# Patient Record
(demographics unavailable — no encounter records)

---

## 2024-11-07 NOTE — HISTORY OF PRESENT ILLNESS
[FreeTextEntry1] : This is a 60 y/o male with a pmhx of GERD, asthma, dilated aortic root, esophageal cancer, MS coming in today for follow up.  Pt is s/p RIH repair with mesh on 8/21/24 by Dr. Campo. No post-op issues noted. Patient to follow up with Dr. Longoria for moderate hiatal hernia and gallstones noted on CT scan. Patient states that fell 2 weeks ago and hurt his back. States that it is improving but still is having some pain. Pt states have been putting a lidocaine patch on and heating pad. Pt states that it is better when moving around and worst at night.   Patient denies chest pain, dyspnea, palpitations, dizziness, changes in bowel/bladder habits or appetite.

## 2024-11-07 NOTE — PHYSICAL EXAM

## 2025-01-04 NOTE — HISTORY OF PRESENT ILLNESS
[TextBox_4] : Had hernia repair. Had COVID x 2 Doing well off Breo. Now going yearly for f/u esophageal CA. Stable off treatment.  Denies significant cough wheezing or chest pain.  Baseline dyspnea on exertion. dealing with MS No issues with swallowing social smoking hx quit age [id number]

## 2025-01-04 NOTE — PHYSICAL EXAM
[Normal Oropharynx] : normal oropharynx [Normal Appearance] : normal appearance [No Neck Mass] : no neck mass [Normal S1, S2] : normal s1, s2 [Clear to Auscultation Bilaterally] : clear to auscultation bilaterally [Normal to Percussion] : normal to percussion [No Abnormalities] : no abnormalities [Benign] : benign [Not Tender] : not tender [No HSM] : no hsm [No Clubbing] : no clubbing [No Cyanosis] : no cyanosis [No Edema] : no edema [TextBox_2] : Overweight

## 2025-01-04 NOTE — DISCUSSION/SUMMARY
[FreeTextEntry1] : Asthma relatively stable without chronic bronchodilator therapy.  Uses PRN beta agonist. Pulmonary nodules Stable. Mild obstructive sleep apnea syndrome, minimal s/s. Has lost weight. MS without evidence of significant restrictive physiology. History of exposure at [email or web address].   Esophageal carcinoma. Preop for endoscopy.

## 2025-01-04 NOTE — HISTORY OF PRESENT ILLNESS
[TextBox_4] : Had hernia repair. Had COVID x 2 Doing well off Breo. Now going yearly for f/u esophageal CA. Stable off treatment.  Denies significant cough wheezing or chest pain.  Baseline dyspnea on exertion. dealing with MS No issues with swallowing social smoking hx quit age [id number]

## 2025-01-04 NOTE — ASSESSMENT
[FreeTextEntry1] : Continue present bronchodilator therapy. F/U CT as per cardiology.  Follow-up in 6 months or sooner on a PRN basis.   35 minutes spent in evaluation management and review of studies.

## 2025-01-04 NOTE — PROCEDURE
[FreeTextEntry1] : Ct chest reviewed November 13, 2024.  Compared to prior.  Reviewed and discussed. Stable small pulmonary nodules.  No significant adenopathy.  Atherosclerotic changes.   Discussed results of 2-night HHS and treatment options  01/03/2025 Pulmonary function testing Normal Flow RatesNormal Lung Volumes. Diffusion capacity is normal.  PFT attached relatively stable function.

## 2025-01-04 NOTE — DISCUSSION/SUMMARY
[FreeTextEntry1] : Asthma relatively stable without chronic bronchodilator therapy.  Uses PRN beta agonist. Pulmonary nodules Stable. Mild obstructive sleep apnea syndrome, minimal s/s. Has lost weight. MS without evidence of significant restrictive physiology. History of exposure at [employer].   Esophageal carcinoma. Preop for endoscopy.

## 2025-02-10 NOTE — HISTORY OF PRESENT ILLNESS
[FreeTextEntry1] : Initial hx 8/2022 Has been walking slowly for >10y.  Retired in 2016; used to be a ; had some gait dysfunction, but did not lead to seeing neurologist.  2016 - [place], had episode of his legs "just stopping", lasted hours and then back to baseline. Then went to Dr. Gómez who felt this was "just old age". Saw several other neurologists over the years. ~[id number] - episode of temporary more severe gait dysfunction during a hot day. went to [place]. Had MRIs. Saw Dr. Nissenbaum, had MRIs showing lesions in brain and cord. dx'd with MS. Then started with Dr. Haider. Started tysabri ~2018, gets it at [facility].  Pt believes he is JCV negative but unsure. No assistive devices. Gait has worsened over past few years, "not as fluid", balance is worse.  10/2022 - Broke his ankle while walking down steps. Stopped tysabri for 2 months.  In 2022, recommended ocrevus given progression. however, continued on tysabri and followed up with Dr. Haider.  Had a fall in 11/2022 and broke his LLE. needed hospitalization and rehab. Since then, never bounced back. 2023 - dx'd with esophageal cancer; was recommended for chemo and esophagectomy; [place] [facility] [place] to [place]. had clear margins.  Summer 2024 - had UTI and was hospitalized.  9/2024 - stopped tysabri because he became JCV positive. Started kesimpta 1/2025.    Subj interval:  Started using the cane in late 2022.   Some stuttering speech.    ROS/Current Sx: cold bothers him more than heat gait dysfunction BL hand pins/needles some falls but only when it's cold and outside  PMHX: MS prostate CA HTN HLD gout asthma esophageal cancer  MEDS: kesimpta allopurinol amlodipine asa81 flonase metoprolol losartan pravastatin proair pantoprazole   SHx: remote tob, no etoh currently, no drugs. used to be a . wife has melanoma  FHx: no MS. no neuro.   O:  AO3. Normally conversant but likely cognitively impaired, asking similar questions repeatedly. Follows commands, names, and repeats. Good attention.  PERRL, VFF, EOMI, no nystagmus, face symmetric, TUP at midline.  Motor:     R:  L: Del   5  5 Bi   5  5 Tri   5  5 Wrist Extensors  5  5 Finger abductors  5  5    5  5   HF   5  5 KE   5  5 KF   5  5 DF   5  5 PF   5  5  Tone   R  L UE   0  0  LE   1  1  Sensory  RUE  LUE  RLE LLE  LT   +  +  +  + Vib   +  +  mod  mod JPS   +  +  +  + PP   +  +  +  + Temp   +  +  +  +  Reflexes:    R  L   Biceps   3  3 BR   3  3 Pat   3  3 AJ   3  3  TOES   F  F  Coordination:    R  L   FTN   +/- +/- JASPAL   +/-  +/- HTS   1  1  Other       Gait: severe wide based gait. possible circumduction on the RLE, appears somewhat spastic. good heel and toe elevation. cannot tandem    Assistance: none   MRI brain, C, and T 2019 - consistent with MS.  MRI brain 12/2022 - largely stable, though a few small lesions are only seen in 2022 and not in 2019 but are small. this may be related to scanner differences.   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 Started tysabri ~2018 until late 2022. Switched to kesimpta in 1/2025.  ______________________ all questions answered, education provided, management discussed at length.   - cont kesimpta - referral to ophtho or neuro-ophtho for evaluation - counseled on using walker or at least 4-prong cane to prevent falls - start PT - lab work q6m while on kesimpta starting in 4/2025 - RTC 3-4m sooner prn

## 2025-02-10 NOTE — HISTORY OF PRESENT ILLNESS
[FreeTextEntry1] : Initial hx 8/2022 Has been walking slowly for >10y.  Retired in 2016; used to be a ; had some gait dysfunction, but did not lead to seeing neurologist.  2016 - [place], had episode of his legs "just stopping", lasted hours and then back to baseline. Then went to Dr. Gómez who felt this was "just old age". Saw several other neurologists over the years. ~[id number] - episode of temporary more severe gait dysfunction during a hot day. went to [place]. Had MRIs. Saw Dr. Nissenbaum, had MRIs showing lesions in brain and cord. dx'd with MS. Then started with Dr. Haider. Started tysabri ~2018, gets it at [facility].  Pt believes he is JCV negative but unsure. No assistive devices. Gait has worsened over past few years, "not as fluid", balance is worse.  10/2022 - Broke his ankle while walking down steps. Stopped tysabri for 2 months.  In 2022, recommended ocrevus given progression. however, continued on tysabri and followed up with Dr. Haider.  Had a fall in 11/2022 and broke his LLE. needed hospitalization and rehab. Since then, never bounced back. 2023 - dx'd with esophageal cancer; was recommended for chemo and esophagectomy; [place] [facility] [place] to [place]. had clear margins.  Summer 2024 - had UTI and was hospitalized.  9/2024 - stopped tysabri because he became JCV positive. Started kesimpta 1/2025.    Subj interval:  Started using the cane in late 2022.   Some stuttering speech.    ROS/Current Sx: cold bothers him more than heat gait dysfunction BL hand pins/needles some falls but only when it's cold and outside  PMHX: MS prostate CA HTN HLD gout asthma esophageal cancer  MEDS: kesimpta allopurinol amlodipine asa81 flonase metoprolol losartan pravastatin proair pantoprazole   SHx: remote tob, no etoh currently, no drugs. used to be a . wife has melanoma  FHx: no MS. no neuro.   O:  AO3. Normally conversant but likely cognitively impaired, asking similar questions repeatedly. Follows commands, names, and repeats. Good attention.  PERRL, VFF, EOMI, no nystagmus, face symmetric, TUP at midline.  Motor:     R:  L: Del   5  5 Bi   5  5 Tri   5  5 Wrist Extensors  5  5 Finger abductors  5  5    5  5   HF   5  5 KE   5  5 KF   5  5 DF   5  5 PF   5  5  Tone   R  L UE   0  0  LE   1  1  Sensory  RUE  LUE  RLE LLE  LT   +  +  +  + Vib   +  +  mod  mod JPS   +  +  +  + PP   +  +  +  + Temp   +  +  +  +  Reflexes:    R  L   Biceps   3  3 BR   3  3 Pat   3  3 AJ   3  3  TOES   F  F  Coordination:    R  L   FTN   +/- +/- JASPAL   +/-  +/- HTS   1  1  Other       Gait: severe wide based gait. possible circumduction on the RLE, appears somewhat spastic. good heel and toe elevation. cannot tandem    Assistance: none   MRI brain, C, and T 2019 - consistent with MS.  MRI brain 12/2022 - largely stable, though a few small lesions are only seen in 2022 and not in 2019 but are small. this may be related to scanner differences.   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 Started tysabri ~2018 until late 2022. Switched to kesimpta in 1/2025.  ______________________ all questions answered, education provided, management discussed at length.   - cont kesimpta - referral to ophtho or neuro-ophtho for evaluation - counseled on using walker or at least 4-prong cane to prevent falls - start PT - lab work q6m while on kesimpta starting in 4/2025 - RTC 3-4m sooner prn

## 2025-03-11 NOTE — HISTORY OF PRESENT ILLNESS
[FreeTextEntry1] : This is a 61 y/o male with a pmhx of GERD, asthma, dilated aortic root, esophageal cancer, MS coming in today for follow up. Patient feels well today, offers no complaints.  Denies chest pain, palpitations, diaphoresis, vision changes, HA, dizziness, syncope, cough, wheezing, SOB/MARTINEZ, edema, fever, chills, infection.

## 2025-06-19 NOTE — HISTORY OF PRESENT ILLNESS
[FreeTextEntry1] : Initial hx 8/2022 Has been walking slowly for >10y.  Retired in 2016; used to be a ; had some gait dysfunction, but did not lead to seeing neurologist.  2016 - [place], had episode of his legs "just stopping", lasted hours and then back to baseline. Then went to Dr. Gómez who felt this was "just old age". Saw several other neurologists over the years. ~[id number] - episode of temporary more severe gait dysfunction during a hot day. went to [place]. Had MRIs. Saw Dr. Nissenbaum, had MRIs showing lesions in brain and cord. dx'd with MS. Then started with Dr. Haider. Started tysabri ~2018, gets it at [facility].  Pt believes he is JCV negative but unsure. No assistive devices. Gait has worsened over past few years, "not as fluid", balance is worse.  10/2022 - Broke his ankle while walking down steps. Stopped tysabri for 2 months.  In 2022, recommended ocrevus given progression. however, continued on tysabri and followed up with Dr. Haider.  lost to f/u from 2/2023 to 2/2025. Had a fall in 11/2022 and broke his LLE. needed hospitalization and rehab. Since then, never bounced back. 2023 - dx'd with esophageal cancer; was recommended for chemo and esophagectomy; then [place] [facility] [place] minimally invasive procedure to remove tumor. had clear margins.  Summer 2024 - had UTI and was hospitalized.  9/2024 - stopped tysabri because he became JCV positive. Started kesimpta 1/2025.    Subj interval:  Started using the cane in late 2022. Switched to walker in mid 2025 because of frequent falls.   Some stuttering speech.    ROS/Current Sx: cold bothers him more than heat gait dysfunction BL hand pins/needles some falls but only when it's cold and outside  PMHX: MS prostate CA HTN HLD gout asthma esophageal cancer  MEDS: kesimpta allopurinol amlodipine asa81 flonase metoprolol losartan pravastatin proair pantoprazole   SHx: remote tob, no etoh currently, no drugs. used to be a . wife has melanoma  FHx: no MS. no neuro.   O:  AO3. Normally conversant but likely cognitively impaired, asking similar questions repeatedly. Follows commands, names, and repeats. Good attention.  PERRL, VFF, EOMI, no nystagmus, face symmetric, TUP at midline.  Motor:     R:  L: Del   5  5 Bi   5  5 Tri   5  5 Wrist Extensors  5  5 Finger abductors  5  5    5  5   HF   5  5 KE   5  5 KF   5  5 DF   5  5 PF   5  5  Tone   R  L UE   0  0  LE   1  1  Sensory  RUE  LUE  RLE LLE  LT   +  +  +  + Vib   +  +  mod  mod JPS   +  +  +  + PP   +  +  +  + Temp   +  +  +  +  Reflexes:    R  L   Biceps   3  3 BR   3  3 Pat   3  3 AJ   3  3  TOES   F  F  Coordination:    R  L   FTN   +/- +/- JASPAL   +/-  +/- HTS   1  1  Other       Gait: severe wide based gait. possible circumduction on the RLE, appears somewhat spastic. good heel and toe elevation. cannot tandem    Assistance: none   MRI brain, C, and T 2019 - consistent with MS.  MRI brain 12/2022 - largely stable, though a few small lesions are only seen in 2022 and not in 2019 but are small. this may be related to scanner differences.   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 Started tysabri ~2018 until late 2022. lost to f/u from 2/2023 to 2/2025. Switched to kesimpta in 1/2025 due to JCV positivity and continued progression.   My neuro exam largely stable c/w 2022 but based on pt report, there has been progression.   all questions answered, education provided, management discussed at length.   - cont kesimpta for now. discussed tolebrutinib data. - trial of baclofen 5tid for sensation of intermittent stiffness/locking of legs, with mild spasticity on exam - f/u w ophtho - counseled on using walker to prevent falls - lab work q6m while on kesimpta - RTC 4m, sooner prn